# Patient Record
Sex: MALE | Race: WHITE
[De-identification: names, ages, dates, MRNs, and addresses within clinical notes are randomized per-mention and may not be internally consistent; named-entity substitution may affect disease eponyms.]

---

## 2019-12-29 ENCOUNTER — HOSPITAL ENCOUNTER (EMERGENCY)
Dept: HOSPITAL 41 - JD.ED | Age: 49
Discharge: HOME | End: 2019-12-29
Payer: COMMERCIAL

## 2019-12-29 DIAGNOSIS — I10: ICD-10-CM

## 2019-12-29 DIAGNOSIS — M62.830: Primary | ICD-10-CM

## 2019-12-29 DIAGNOSIS — Z79.899: ICD-10-CM

## 2019-12-29 PROCEDURE — 96372 THER/PROPH/DIAG INJ SC/IM: CPT

## 2019-12-29 PROCEDURE — 99283 EMERGENCY DEPT VISIT LOW MDM: CPT

## 2019-12-29 NOTE — EDM.PDOC
ED HPI GENERAL MEDICAL PROBLEM





- General


Chief Complaint: Back Pain or Injury


Stated Complaint: BACK AND HIP PAIN


Time Seen by Provider: 12/29/19 08:51





- History of Present Illness


INITIAL COMMENTS - FREE TEXT/NARRATIVE: 





49-year-old male presents emergency room back pain.





This is been progressively getting worse over the last couple of days he's had 

low back pain going down the back of his thighs stops above the knees. No pain 

or tingling in his feet or lower legs. She denies any fevers or chills no 

burning or frequency with urination no gastrointestinal symptoms no nausea 

vomiting constipation diarrhea or abdominal pain. Patient is back issues in the 

past MRI showed what sounds like bone spurs in the past does not sound like he 

has any active or past history of radicular symptoms. At this point he denies 

any loss of bowel or bladder control. Before his back pain started the patient 

was getting over a sinus cold. He was coughing somewhat with this, however this 

is resolved but he wonders if the coughing irritated his back.








  ** Right Hip


Pain Score (Numeric/FACES): 10





- Related Data


 Allergies











Allergy/AdvReac Type Severity Reaction Status Date / Time


 


No Known Allergies Allergy   Verified 12/29/19 08:45











Home Meds: 


 Home Meds





Cyclobenzaprine [Flexeril] 10 mg PO TID #12 tab 12/29/19 [Rx]


Lisinopril [Zestril] 10 mg PO DAILY 12/29/19 [History]


Naproxen [Naprosyn] 500 mg PO Q12H #20 tablet 12/29/19 [Rx]











Past Medical History


Cardiovascular History: Reports: Hypertension


Musculoskeletal History: Reports: Other (See Below)


Other Musculoskeletal History: tibial fracture





- Past Surgical History


Musculoskeletal Surgical History: Reports: Other (See Below)


Other Musculoskeletal Surgeries/Procedures:: rotator cuff





Social & Family History





- Tobacco Use


Smoking Status *Q: Never Smoker





ED ROS GENERAL





- Review of Systems


Review Of Systems: See Below


Constitutional: Reports: No Symptoms


Respiratory: Reports: No Symptoms


Cardiovascular: Reports: No Symptoms


GI/Abdominal: Reports: No Symptoms


: Reports: No Symptoms


Musculoskeletal: Reports: Back Pain


Skin: Reports: No Symptoms


Neurological: Reports: No Symptoms





ED EXAM,LOWER BACK PAIN/INJURY





- Physical Exam


Exam: See Below


Exam Limited By: Other (The patient is examined standing up for the most part 

as change of position is very difficult for him.)


General Appearance: Alert, Mild Distress (From the discomfort and inability to 

get into a comfortable position)


Head: Atraumatic, Normocephalic


Neck: Normal Inspection, Supple, Non-Tender, Full Range of Motion


Respiratory/Chest: No Respiratory Distress, Lungs Clear, Normal Breath Sounds, 

No Accessory Muscle Use, Chest Non-Tender


Cardiovascular: Regular Rate, Rhythm, No Edema, No Murmur


GI/Abdominal: Normal Bowel Sounds, Soft, Non-Tender


Back Exam: Other (No midline vertebral tenderness he has bilateral paraspinous 

muscle spasm worse on the right compared to the left.).  No: CVA Tenderness (L)

, CVA Tenderness (R)


Skin Exam: Warm, Dry, Intact





Course





- Vital Signs


Last Recorded V/S: 


 Last Vital Signs











Temp  36.6 C   12/29/19 08:42


 


Pulse  109 H  12/29/19 08:42


 


Resp  16   12/29/19 08:42


 


BP  175/99 H  12/29/19 09:30


 


Pulse Ox  100   12/29/19 08:42














- Orders/Labs/Meds


Meds: 


Medications














Discontinued Medications














Generic Name Dose Route Start Last Admin





  Trade Name Freq  PRN Reason Stop Dose Admin


 


Cyclobenzaprine HCl  10 mg  12/29/19 09:28  12/29/19 09:33





  Flexeril  PO  12/29/19 09:29  10 mg





  ONETIME ONE   Administration





     





     





     





     


 


Ketorolac Tromethamine  30 mg  12/29/19 09:25  12/29/19 09:30





  Toradol  IM  12/29/19 09:26  30 mg





  ONETIME ONE   Administration





     





     





     





     


 


Lisinopril  10 mg  12/29/19 09:25  12/29/19 09:30





  Prinivil  PO  12/29/19 09:26  10 mg





  ONETIME ONE   Administration





     





     





     





     














- Re-Assessments/Exams


Free Text/Narrative Re-Assessment/Exam: 





12/29/19 10:51


Patient is doing much better he is able sit down at this time. He'll be 

discharged on Naprosyn and Flexeril. He should be able to  his 

lisinopril at the pharmacy tomorrow.





Departure





- Departure


Time of Disposition: 10:52


Disposition: Home, Self-Care 01


Clinical Impression: 


 Low back pain, Spasm of muscle of lower back








- Discharge Information


Prescriptions: 


Cyclobenzaprine [Flexeril] 10 mg PO TID #12 tab


Naproxen [Naprosyn] 500 mg PO Q12H #20 tablet


Referrals: 


Chula Tabares PA-C [Primary Care Provider] - 


Forms:  ED Department Discharge


Additional Instructions: 


Return to the emergency room with any questions problems worsening symptoms. 





Take the medications as directed. 





Follow-up with your regular provider in 2 days if needed. 





 your blood pressure medication tomorrow.





Sepsis Event Note





- Evaluation


Sepsis Screening Result: No Definite Risk





- Focused Exam


Vital Signs: 


 Vital Signs











  Temp Pulse Resp BP BP Pulse Ox


 


 12/29/19 09:30     175/99 H  


 


 12/29/19 08:42  36.6 C  109 H  16   137/98 H  100











Date Exam was Performed: 12/29/19


Time Exam was Performed: 10:51